# Patient Record
Sex: MALE | Race: WHITE | Employment: STUDENT | URBAN - METROPOLITAN AREA
[De-identification: names, ages, dates, MRNs, and addresses within clinical notes are randomized per-mention and may not be internally consistent; named-entity substitution may affect disease eponyms.]

---
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
## 2025-05-26 ENCOUNTER — HOSPITAL ENCOUNTER (EMERGENCY)
Facility: HOSPITAL | Age: 20
Discharge: HOME | End: 2025-05-26
Attending: EMERGENCY MEDICINE
Payer: COMMERCIAL

## 2025-05-26 VITALS
TEMPERATURE: 97.3 F | BODY MASS INDEX: 31.24 KG/M2 | RESPIRATION RATE: 18 BRPM | DIASTOLIC BLOOD PRESSURE: 88 MMHG | HEIGHT: 78 IN | HEART RATE: 95 BPM | OXYGEN SATURATION: 99 % | WEIGHT: 270 LBS | SYSTOLIC BLOOD PRESSURE: 146 MMHG

## 2025-05-26 DIAGNOSIS — S91.319A LACERATION OF FOOT, UNSPECIFIED LATERALITY, INITIAL ENCOUNTER: ICD-10-CM

## 2025-05-26 DIAGNOSIS — S90.819A ABRASION OF FOOT, UNSPECIFIED LATERALITY, INITIAL ENCOUNTER: Primary | ICD-10-CM

## 2025-05-26 PROCEDURE — 90471 IMMUNIZATION ADMIN: CPT | Performed by: PHYSICIAN ASSISTANT

## 2025-05-26 PROCEDURE — 90714 TD VACC NO PRESV 7 YRS+ IM: CPT | Performed by: PHYSICIAN ASSISTANT

## 2025-05-26 PROCEDURE — 6360000002 HC RX W HCPCS: Performed by: PHYSICIAN ASSISTANT

## 2025-05-26 PROCEDURE — 99284 EMERGENCY DEPT VISIT MOD MDM: CPT

## 2025-05-26 RX ORDER — CIPROFLOXACIN 500 MG/1
500 TABLET, FILM COATED ORAL 2 TIMES DAILY
Qty: 6 TABLET | Refills: 0 | Status: SHIPPED | OUTPATIENT
Start: 2025-05-26 | End: 2025-05-29

## 2025-05-26 RX ADMIN — CLOSTRIDIUM TETANI TOXOID ANTIGEN (FORMALDEHYDE INACTIVATED) AND CORYNEBACTERIUM DIPHTHERIAE TOXOID ANTIGEN (FORMALDEHYDE INACTIVATED) 0.5 ML: 5; 2 INJECTION, SUSPENSION INTRAMUSCULAR at 18:13

## 2025-05-26 ASSESSMENT — PAIN SCALES - GENERAL: PAINLEVEL_OUTOF10: 2

## 2025-05-26 ASSESSMENT — PAIN - FUNCTIONAL ASSESSMENT: PAIN_FUNCTIONAL_ASSESSMENT: 0-10

## 2025-05-26 NOTE — ED TRIAGE NOTES
Patient cc foot injury to bilateral feet. [place] and cut his feet on rocks. Patient states pain is 3/10. Wants to make sure the cuts are not infected. States he put antiseptic on the cuts. No other complaints.

## 2025-05-26 NOTE — DISCHARGE INSTRUCTIONS
Continue to clean the wounds daily with water and mild soap.  You may apply Vaseline to the wounds and a bandage to the wounds to protect them and promote healing.  Return to the emergency department for reevaluation if you develop new or significantly worsening symptoms including but not limited to spreading redness, increasing swelling, increasing pain, milk like drainage, or bleeding for longer than 15 minutes.

## 2025-05-27 NOTE — ED PROVIDER NOTES
[facility] EMERGENCY DEPARTMENT  EMERGENCY DEPARTMENT ENCOUNTER      Pt Name: Jamel Gonzáles  MRN: [id number]  Birthdate 2005  Date of evaluation: 5/26/2025  Provider: Man Dooley PA-C    CHIEF COMPLAINT       Chief Complaint   Patient presents with    Foot Injury     Bilateral foot injury from going [place] the river. 24 hours ago          HISTORY OF PRESENT ILLNESS   (Location/Symptom, Timing/Onset, Context/Setting, Quality, Duration, Modifying Factors, Severity)  Note limiting factors.   Patient presents due to bilateral foot abrasions after walking in a river 24 hours ago.  He is concerned that his wounds may be infected.    Denies past medical history.            Review of External Medical Records:     Nursing Notes were reviewed.    REVIEW OF SYSTEMS    (2-9 systems for level 4, 10 or more for level 5)     Review of Systems    Except as noted above the remainder of the review of systems was reviewed and negative.       PAST MEDICAL HISTORY     Past Medical History:   Diagnosis Date    Lumbar radiculopathy     Spondylolysis          SURGICAL HISTORY     No past surgical history on file.      CURRENT MEDICATIONS       Discharge Medication List as of 5/26/2025  6:35 PM          ALLERGIES     Patient has no known allergies.    FAMILY HISTORY       Family History   Problem Relation Age of Onset    Asthma Mother     Asthma Sister     Lung Disease Maternal Grandmother           SOCIAL HISTORY       Social History     Socioeconomic History    Marital status: Single   Tobacco Use    Smoking status: Never    Smokeless tobacco: Never           PHYSICAL EXAM    (up to 7 for level 4, 8 or more for level 5)     ED Triage Vitals [05/26/25 1634]   BP Systolic BP Percentile Diastolic BP Percentile Temp Temp Source Pulse Respirations SpO2   (!) 146/88 -- -- 97.3 °F (36.3 °C) Oral 95 18 99 %      Height Weight - Scale         1.981 m (6' 6\") 122.5 kg (270 lb)             Body mass index is 31.2 kg/m².    Physical